# Patient Record
Sex: MALE | Race: WHITE
[De-identification: names, ages, dates, MRNs, and addresses within clinical notes are randomized per-mention and may not be internally consistent; named-entity substitution may affect disease eponyms.]

---

## 2019-10-19 ENCOUNTER — HOSPITAL ENCOUNTER (EMERGENCY)
Dept: HOSPITAL 74 - FER | Age: 64
Discharge: HOME | End: 2019-10-19
Payer: COMMERCIAL

## 2019-10-19 VITALS — DIASTOLIC BLOOD PRESSURE: 80 MMHG | TEMPERATURE: 97.9 F | SYSTOLIC BLOOD PRESSURE: 133 MMHG | HEART RATE: 73 BPM

## 2019-10-19 VITALS — BODY MASS INDEX: 29 KG/M2

## 2019-10-19 DIAGNOSIS — I51.9: ICD-10-CM

## 2019-10-19 DIAGNOSIS — Z96.651: ICD-10-CM

## 2019-10-19 DIAGNOSIS — R33.9: Primary | ICD-10-CM

## 2019-10-19 DIAGNOSIS — M19.90: ICD-10-CM

## 2019-10-19 DIAGNOSIS — E78.00: ICD-10-CM

## 2019-10-19 DIAGNOSIS — Z96.641: ICD-10-CM

## 2019-10-19 DIAGNOSIS — N40.0: ICD-10-CM

## 2019-10-19 NOTE — PDOC
History of Present Illness





- General


Chief Complaint: Urinary Problem


Stated Complaint: URINARY RETENTION


Time Seen by Provider: 10/19/19 11:08


History Source: Patient, Spouse


Exam Limitations: No Limitations





- History of Present Illness


Initial Comments: 





10/19/19 12:05


CHIEF COMPLAINT: Urinary retention for a couple of days





HISTORY OF PRESENT ILLNESS: 64-year-old man with a history of BPH, had a right 

hip replacement on October 8.  He was discharged but then had difficulty 

urinating and went back to the hospital on October 10 for urinary retention.  A 

catheter was placed and he was sent home with the catheter.  The catheter was 

removed 3 days ago by his urologist.  He continues on Flomax 2 tablets daily.  

Since removal, he has had a decreased urinary stream, and today he is having 

bladder distention and discomfort with inability to pass urine other than minor 

dribbling.  He denies hematuria, fever, chills, or back pain.





Since the surgery, he has also noted increased swelling of his right leg.  The 

right leg is much more swollen than the left.  There is no chest pain or 

shortness of breath.  There is no history of prior DVT.  He is status post both 

a right hip replacement on October 8 as well as a remote right knee 

replacement.  He states his right leg has never been as good as his left.





REVIEW OF SYSTEMS:


GENERAL/CONSTITUTIONAL: No fever or chills. No weakness. No weight change.


HEAD, EYES, EARS, NOSE AND THROAT: No change in vision. No ear pain or 

discharge. No sore throat.


CARDIOVASCULAR: No chest pain or shortness of breath.


RESPIRATORY: No cough, wheezing, or hemoptysis.


GASTROINTESTINAL: No nausea, vomiting, diarrhea or constipation. No rectal 

bleeding.


GENITOURINARY: No dysuria.  Positive frequency.  Positive hesitancy.  Positive 

urinary retention.  No hematuria.


MUSCULOSKELETAL: Positive right leg and ankle swelling much greater than the 

left.  Status post right hip and right knee replacements.


SKIN AND BREASTS: No rash or easy bruising.


NEUROLOGIC: No headache, vertigo, loss of consciousness, or loss of sensation.


PSYCHIATRIC: No depression or anxiety.


ENDOCRINE: No increased thirst. No abnormal weight change.


HEMATOLOGIC/LYMPHATIC: No anemia, easy bleeding, or history of blood clots.


ALLERGIC/IMMUNOLOGIC: No hives or skin allergy. No latex allergy.














Past History





- Past Medical History


Allergies/Adverse Reactions: 


 Allergies











Allergy/AdvReac Type Severity Reaction Status Date / Time


 


Penicillins Allergy Mild  Verified 10/19/19 11:56











Home Medications: 


Ambulatory Orders





Aspirin [Aspirin EC] 81 mg PO DAILY 10/19/19 


Atorvastatin Ca [Lipitor] 40 mg PO HS 10/19/19 


Bupropion HCl [Wellbutrin Xl -] 300 mg PO HS 10/19/19 


Docusate Sodium [Colace] 100 mg PO BID 10/19/19 


Ranolazine [Ranexa] 500 mg PO BID 10/19/19 


Tadalafil [Cialis] 5 mg PO ASDIR 10/19/19 


Tamsulosin HCl [Flomax] 0.8 mg PO HS 10/19/19 


Tramadol HCl [Ultram] 50 mg PO BID PRN 10/19/19 








Cardiac Disorders: Yes (Angina on medication, negative cath, no stents)


 Disorders: Yes (BPH)


Hypercholesterolemia: Yes


Other medical history: Osteoarthritis





- Surgical History


Orthopedic Surgery: Yes (Right hip replacement, right knee replacement)





- Psycho Social/Smoking Cessation Hx


Smoking History: Never smoked


Have you smoked in the past 12 months: No


Number of Cigarettes Smoked Daily: 0


Cigars Per Day: 0


'Breaking Loose' booklet given: 04/19/14


Hx Alcohol Use: No


Substance Use Type: None, Alcohol


Hx Substance Use Treatment: No





*Physical Exam





- Physical Exam


Comments: 





10/19/19 12:10


GENERAL: The patient is awake, alert, and fully oriented, in no acute distress.

  He states he is feeling much better now that the bladder catheter has been 

placed.  The suprapubic discomfort has resolved.


HEAD: Normal with no signs of trauma.


EYES: Pupils equal, round and reactive to light, extraocular movements intact, 

sclera anicteric, conjunctiva clear.


ENT: Ears normal, nares patent, oropharynx clear without exudates.  Moist 

mucous membranes.


NECK: Normal range of motion, supple without lymphadenopathy, JVD, or masses.


LUNGS: Breath sounds equal, clear to auscultation bilaterally.  No wheezes, and 

no crackles.


HEART: Regular rate and rhythm, normal S1 and S2 without murmur, rub or gallop.


ABDOMEN: Soft, nontender, normoactive bowel sounds.  No guarding, no rebound.  

No masses.


EXTREMITIES: The right leg has pitting edema up to the knee.  The left leg has 

trace pitting ankle edema much less than the right.  No cords.  No erythema.  

No tenderness.


NEUROLOGICAL: Cranial nerves II through XII grossly intact.  Normal speech, 

normal gait.


PSYCH: Normal mood, normal affect.


SKIN: Warm, Dry, normal turgor, no rashes or lesions noted.





ED Treatment Course





- RADIOLOGY


Radiology Studies Ordered: 














 Category Date Time Status


 


 DUPLEX VASCUL US-1 LEG [US] Stat Ultrasound  10/19/19 12:04 Ordered














Medical Decision Making





- Medical Decision Making





10/19/19 13:07


64-year-old man with history of BPH and previous urinary retention.  His 

urinary retention was worse after hip surgery on October 8 requiring an 

indwelling catheter as an outpatient.  The catheter was removed a few days ago 

and he is now having recurrent urinary retention.  No fever chills or other 

infectious symptoms.





On examination he presented with discomfort and suprapubic fullness.  A 

catheter was placed with 900 cc out initially.  Patient had immediate relief.  

Repeat examination was benign abdomen, soft and nontender.





Urinalysis was reviewed, no signs of infection, normal.





Impression: Recurrent urinary retention with a history of BPH





Plan: Patient to be discharged with Collier and leg bag.  He has a urologist at 

[facility], Dr. Boggs, he will call on Monday and schedule a 

follow-up appointment.  In the meantime, he will continue Flomax and Cialis for 

urinary retention.  No indication for antibiotics at this time.





Discharge





- Discharge Information


Problems reviewed: Yes


Clinical Impression/Diagnosis: 


 Acute urinary retention





Condition: Improved


Disposition: HOME





- Admission


No





- Follow up/Referral


Referrals: 


Fredy Fan [Primary Care Provider] - 





- Patient Discharge Instructions


Patient Printed Discharge Instructions:  [facility] for Urinary Retention in Men


Additional Instructions: 


Today you were evaluated for urinary retention from prostate  enlargement.  A 

Collier catheter with a leg bag was placed with 900 cc of urine output.  The 

urinalysis testing shows no signs of infection.  You are advised to continue 

Flomax and Cialis.  Follow-up with Dr. Boggs, your urologist, by calling her 

on Monday to schedule a follow-up appointment.  Empty the leg bag as needed.  

Return to the emergency department for any severe or progressive symptoms, 

including fever, bloody, or cloudy urine.





- Post Discharge Activity

## 2025-07-13 ENCOUNTER — NON-APPOINTMENT (OUTPATIENT)
Age: 70
End: 2025-07-13

## 2025-07-17 ENCOUNTER — NON-APPOINTMENT (OUTPATIENT)
Age: 70
End: 2025-07-17

## 2025-07-31 PROBLEM — Z00.00 ENCOUNTER FOR PREVENTIVE HEALTH EXAMINATION: Status: ACTIVE | Noted: 2025-07-31

## 2025-08-04 ENCOUNTER — APPOINTMENT (OUTPATIENT)
Dept: FAMILY MEDICINE | Facility: CLINIC | Age: 70
End: 2025-08-04
Payer: MEDICARE

## 2025-08-04 ENCOUNTER — NON-APPOINTMENT (OUTPATIENT)
Age: 70
End: 2025-08-04

## 2025-08-04 VITALS
SYSTOLIC BLOOD PRESSURE: 130 MMHG | RESPIRATION RATE: 18 BRPM | HEIGHT: 65 IN | OXYGEN SATURATION: 96 % | BODY MASS INDEX: 32.15 KG/M2 | TEMPERATURE: 97.6 F | WEIGHT: 193 LBS | HEART RATE: 81 BPM | DIASTOLIC BLOOD PRESSURE: 80 MMHG

## 2025-08-04 DIAGNOSIS — F41.1 GENERALIZED ANXIETY DISORDER: ICD-10-CM

## 2025-08-04 DIAGNOSIS — Z81.0 FAMILY HISTORY OF INTELLECTUAL DISABILITIES: ICD-10-CM

## 2025-08-04 DIAGNOSIS — F10.90 ALCOHOL USE, UNSPECIFIED, UNCOMPLICATED: ICD-10-CM

## 2025-08-04 DIAGNOSIS — Z82.3 FAMILY HISTORY OF STROKE: ICD-10-CM

## 2025-08-04 DIAGNOSIS — Z83.79 FAMILY HISTORY OF OTHER DISEASES OF THE DIGESTIVE SYSTEM: ICD-10-CM

## 2025-08-04 DIAGNOSIS — Z82.49 FAMILY HISTORY OF ISCHEMIC HEART DISEASE AND OTHER DISEASES OF THE CIRCULATORY SYSTEM: ICD-10-CM

## 2025-08-04 DIAGNOSIS — N52.9 MALE ERECTILE DYSFUNCTION, UNSPECIFIED: ICD-10-CM

## 2025-08-04 DIAGNOSIS — E78.5 HYPERLIPIDEMIA, UNSPECIFIED: ICD-10-CM

## 2025-08-04 DIAGNOSIS — R68.82 DECREASED LIBIDO: ICD-10-CM

## 2025-08-04 DIAGNOSIS — R53.83 OTHER FATIGUE: ICD-10-CM

## 2025-08-04 DIAGNOSIS — F33.2 MAJOR DEPRESSIVE DISORDER, RECURRENT SEVERE W/OUT PSYCHOTIC FEATURES: ICD-10-CM

## 2025-08-04 DIAGNOSIS — Z80.0 FAMILY HISTORY OF MALIGNANT NEOPLASM OF DIGESTIVE ORGANS: ICD-10-CM

## 2025-08-04 DIAGNOSIS — N40.0 BENIGN PROSTATIC HYPERPLASIA WITHOUT LOWER URINARY TRACT SYMPMS: ICD-10-CM

## 2025-08-04 DIAGNOSIS — I25.10 ATHEROSCLEROTIC HEART DISEASE OF NATIVE CORONARY ARTERY W/OUT ANGINA PECTORIS: ICD-10-CM

## 2025-08-04 DIAGNOSIS — G47.30 SLEEP APNEA, UNSPECIFIED: ICD-10-CM

## 2025-08-04 PROCEDURE — 99204 OFFICE O/P NEW MOD 45 MIN: CPT

## 2025-08-04 PROCEDURE — G2211 COMPLEX E/M VISIT ADD ON: CPT

## 2025-08-04 PROCEDURE — 36415 COLL VENOUS BLD VENIPUNCTURE: CPT

## 2025-08-05 LAB
25(OH)D3 SERPL-MCNC: 39.4 NG/ML
ALBUMIN SERPL ELPH-MCNC: 4.7 G/DL
ALP BLD-CCNC: 88 U/L
ALT SERPL-CCNC: 32 U/L
ANION GAP SERPL CALC-SCNC: 17 MMOL/L
APPEARANCE: CLEAR
AST SERPL-CCNC: 25 U/L
BASOPHILS # BLD AUTO: 0.04 K/UL
BASOPHILS NFR BLD AUTO: 0.7 %
BILIRUB SERPL-MCNC: 0.4 MG/DL
BILIRUBIN URINE: NEGATIVE
BLOOD URINE: NEGATIVE
BUN SERPL-MCNC: 14 MG/DL
CALCIUM SERPL-MCNC: 10.4 MG/DL
CHLORIDE SERPL-SCNC: 107 MMOL/L
CHOLEST SERPL-MCNC: 185 MG/DL
CO2 SERPL-SCNC: 22 MMOL/L
COLOR: YELLOW
CREAT SERPL-MCNC: 0.96 MG/DL
EGFRCR SERPLBLD CKD-EPI 2021: 84 ML/MIN/1.73M2
EOSINOPHIL # BLD AUTO: 0.14 K/UL
EOSINOPHIL NFR BLD AUTO: 2.3 %
ESTIMATED AVERAGE GLUCOSE: 128 MG/DL
FOLATE SERPL-MCNC: >20 NG/ML
GLUCOSE QUALITATIVE U: NEGATIVE MG/DL
GLUCOSE SERPL-MCNC: 79 MG/DL
HBA1C MFR BLD HPLC: 6.1 %
HCT VFR BLD CALC: 47 %
HDLC SERPL-MCNC: 57 MG/DL
HGB BLD-MCNC: 15.1 G/DL
IMM GRANULOCYTES NFR BLD AUTO: 0.3 %
KETONES URINE: NEGATIVE MG/DL
LDLC SERPL-MCNC: 109 MG/DL
LEUKOCYTE ESTERASE URINE: NEGATIVE
LYMPHOCYTES # BLD AUTO: 1.81 K/UL
LYMPHOCYTES NFR BLD AUTO: 30.3 %
MAGNESIUM SERPL-MCNC: 2.2 MG/DL
MAN DIFF?: NORMAL
MCHC RBC-ENTMCNC: 31.2 PG
MCHC RBC-ENTMCNC: 32.1 G/DL
MCV RBC AUTO: 97.1 FL
MONOCYTES # BLD AUTO: 0.47 K/UL
MONOCYTES NFR BLD AUTO: 7.9 %
NEUTROPHILS # BLD AUTO: 3.49 K/UL
NEUTROPHILS NFR BLD AUTO: 58.5 %
NITRITE URINE: NEGATIVE
NONHDLC SERPL-MCNC: 128 MG/DL
PH URINE: 6
PLATELET # BLD AUTO: 274 K/UL
POTASSIUM SERPL-SCNC: 5 MMOL/L
PROLACTIN SERPL-MCNC: 6.4 NG/ML
PROT SERPL-MCNC: 7.3 G/DL
PROTEIN URINE: NEGATIVE MG/DL
PSA SERPL-MCNC: 3.03 NG/ML
RBC # BLD: 4.84 M/UL
RBC # FLD: 13.2 %
SODIUM SERPL-SCNC: 146 MMOL/L
SPECIFIC GRAVITY URINE: 1.01
TESTOST SERPL-MCNC: 305 NG/DL
TRIGL SERPL-MCNC: 110 MG/DL
TSH SERPL-ACNC: 1.86 UIU/ML
URATE SERPL-MCNC: 5.4 MG/DL
UROBILINOGEN URINE: 0.2 MG/DL
VIT B12 SERPL-MCNC: 695 PG/ML
WBC # FLD AUTO: 5.97 K/UL

## 2025-08-06 PROBLEM — Z82.3 FAMILY HISTORY OF CEREBROVASCULAR ACCIDENT (CVA): Status: ACTIVE | Noted: 2025-08-06

## 2025-08-06 PROBLEM — Z82.49 FAMILY HISTORY OF VALVULAR HEART DISEASE: Status: ACTIVE | Noted: 2025-08-06

## 2025-08-06 PROBLEM — F10.90 ALCOHOL USE: Status: ACTIVE | Noted: 2025-08-06

## 2025-08-06 PROBLEM — R53.83 FATIGUE, UNSPECIFIED TYPE: Status: ACTIVE | Noted: 2025-08-04

## 2025-08-06 PROBLEM — Z80.0 FAMILY HISTORY OF HEPATOMA: Status: ACTIVE | Noted: 2025-08-06

## 2025-08-06 PROBLEM — I25.10 ATHEROSCLEROSIS OF NATIVE CORONARY ARTERY OF NATIVE HEART WITHOUT ANGINA PECTORIS: Status: ACTIVE | Noted: 2025-08-04

## 2025-08-06 PROBLEM — R68.82 DECREASED LIBIDO: Status: ACTIVE | Noted: 2025-08-04

## 2025-08-06 PROBLEM — Z81.0 FAMILY HISTORY OF INTELLECTUAL DISABILITY: Status: ACTIVE | Noted: 2025-08-06

## 2025-08-06 PROBLEM — N40.0 BENIGN PROSTATIC HYPERPLASIA, UNSPECIFIED WHETHER LOWER URINARY TRACT SYMPTOMS PRESENT: Status: ACTIVE | Noted: 2025-08-04

## 2025-08-06 PROBLEM — F33.2 SEVERE EPISODE OF RECURRENT MAJOR DEPRESSIVE DISORDER, WITHOUT PSYCHOTIC FEATURES: Status: ACTIVE | Noted: 2025-08-04

## 2025-08-06 PROBLEM — F41.1 GAD (GENERALIZED ANXIETY DISORDER): Status: ACTIVE | Noted: 2025-08-06

## 2025-08-06 PROBLEM — E78.5 HYPERLIPIDEMIA, UNSPECIFIED HYPERLIPIDEMIA TYPE: Status: ACTIVE | Noted: 2025-08-04

## 2025-08-06 PROBLEM — N52.9 ERECTILE DYSFUNCTION ASSOCIATED WITH VASCULOPATHY: Status: ACTIVE | Noted: 2025-08-06

## 2025-08-06 PROBLEM — Z83.79 FAMILY HISTORY OF COLITIS: Status: ACTIVE | Noted: 2025-08-06

## 2025-08-06 PROBLEM — G47.30 SLEEP APNEA IN ADULT: Status: ACTIVE | Noted: 2025-08-04

## 2025-08-06 RX ORDER — CITALOPRAM 10 MG/1
10 TABLET, FILM COATED ORAL DAILY
Qty: 90 | Refills: 0 | Status: ACTIVE | COMMUNITY

## 2025-08-06 RX ORDER — ATORVASTATIN CALCIUM 40 MG/1
40 TABLET, FILM COATED ORAL
Qty: 90 | Refills: 0 | Status: ACTIVE | COMMUNITY

## 2025-08-06 RX ORDER — BUPROPION HYDROCHLORIDE 150 MG/1
150 TABLET, EXTENDED RELEASE ORAL DAILY
Qty: 90 | Refills: 0 | Status: ACTIVE | COMMUNITY

## 2025-08-06 RX ORDER — TADALAFIL 10 MG/1
10 TABLET, FILM COATED ORAL
Refills: 0 | Status: ACTIVE | COMMUNITY

## 2025-08-06 RX ORDER — BUPROPION HYDROCHLORIDE 300 MG/1
300 TABLET, EXTENDED RELEASE ORAL
Qty: 90 | Refills: 0 | Status: ACTIVE | COMMUNITY

## 2025-08-06 RX ORDER — RANOLAZINE 500 MG/1
500 TABLET, FILM COATED, EXTENDED RELEASE ORAL
Refills: 0 | Status: ACTIVE | COMMUNITY